# Patient Record
Sex: MALE | Race: ASIAN | NOT HISPANIC OR LATINO | URBAN - METROPOLITAN AREA
[De-identification: names, ages, dates, MRNs, and addresses within clinical notes are randomized per-mention and may not be internally consistent; named-entity substitution may affect disease eponyms.]

---

## 2017-12-11 ENCOUNTER — EMERGENCY (EMERGENCY)
Facility: HOSPITAL | Age: 47
LOS: 1 days | Discharge: HOME | End: 2017-12-11
Attending: EMERGENCY MEDICINE | Admitting: EMERGENCY MEDICINE
Payer: MEDICAID

## 2017-12-11 VITALS
HEART RATE: 70 BPM | SYSTOLIC BLOOD PRESSURE: 168 MMHG | TEMPERATURE: 98 F | DIASTOLIC BLOOD PRESSURE: 50 MMHG | OXYGEN SATURATION: 98 % | RESPIRATION RATE: 17 BRPM

## 2017-12-11 VITALS
DIASTOLIC BLOOD PRESSURE: 93 MMHG | SYSTOLIC BLOOD PRESSURE: 164 MMHG | HEART RATE: 77 BPM | HEIGHT: 66 IN | TEMPERATURE: 98 F | OXYGEN SATURATION: 98 % | WEIGHT: 188.05 LBS | RESPIRATION RATE: 16 BRPM

## 2017-12-11 PROCEDURE — 99284 EMERGENCY DEPT VISIT MOD MDM: CPT

## 2017-12-11 PROCEDURE — 96374 THER/PROPH/DIAG INJ IV PUSH: CPT

## 2017-12-11 PROCEDURE — 96375 TX/PRO/DX INJ NEW DRUG ADDON: CPT

## 2017-12-11 PROCEDURE — 99284 EMERGENCY DEPT VISIT MOD MDM: CPT | Mod: 25

## 2017-12-11 RX ORDER — EPINEPHRINE 0.3 MG/.3ML
0.3 INJECTION INTRAMUSCULAR; SUBCUTANEOUS
Qty: 1 | Refills: 0 | OUTPATIENT
Start: 2017-12-11 | End: 2017-12-11

## 2017-12-11 RX ORDER — DIPHENHYDRAMINE HCL 50 MG
25 CAPSULE ORAL ONCE
Qty: 0 | Refills: 0 | Status: COMPLETED | OUTPATIENT
Start: 2017-12-11 | End: 2017-12-11

## 2017-12-11 RX ORDER — FAMOTIDINE 10 MG/ML
20 INJECTION INTRAVENOUS ONCE
Qty: 0 | Refills: 0 | Status: COMPLETED | OUTPATIENT
Start: 2017-12-11 | End: 2017-12-11

## 2017-12-11 RX ORDER — SODIUM CHLORIDE 9 MG/ML
1000 INJECTION INTRAMUSCULAR; INTRAVENOUS; SUBCUTANEOUS ONCE
Qty: 0 | Refills: 0 | Status: DISCONTINUED | OUTPATIENT
Start: 2017-12-11 | End: 2017-12-15

## 2017-12-11 RX ADMIN — Medication 125 MILLIGRAM(S): at 09:31

## 2017-12-11 RX ADMIN — FAMOTIDINE 20 MILLIGRAM(S): 10 INJECTION INTRAVENOUS at 09:32

## 2017-12-11 RX ADMIN — Medication 25 MILLIGRAM(S): at 09:32

## 2017-12-11 NOTE — ED PROVIDER NOTE - OBJECTIVE STATEMENT
46 yo male no pmhx s/p drinking carrot juice 1 hour ago c/o intranasal swelling, hoarse voice, b/l eyelid swelling, difficulty breathing.  No difficulty swallowing. No rash, no chest pain.  Took 25mg of childrens benadryl prior to arrival.

## 2017-12-11 NOTE — ED PROVIDER NOTE - ENMT, MLM
Airway patent, b/l intranasal swelling noted. Mouth with normal mucosa. Throat has no vesicles, no oropharyngeal exudates. +uvula swelling, no tongue swelling

## 2017-12-11 NOTE — ED PROVIDER NOTE - MEDICAL DECISION MAKING DETAILS
allergic reaction to food, IV benadryl, IV steroids, IV pepcid, will hold on epipen, patients VSS otherwise stable, will observe

## 2017-12-11 NOTE — ED ADULT TRIAGE NOTE - CHIEF COMPLAINT QUOTE
"I had carrot juice with dimitri this morning. My throat hurts, I'm having trouble breathing, my nose is leaking. I took children's Benadryl this morning"

## 2017-12-11 NOTE — ED ADULT NURSE NOTE - CHPI ED SYMPTOMS NEG
no cough/no wheezing/no vomiting/no shortness of breath/no nausea/no rash/no difficulty swallowing/no difficulty breathing

## 2019-03-20 NOTE — ED ADULT NURSE NOTE - NS ED PATIENT SAFETY CONCERN
No I have reviewed and confirmed nurses' notes for patient's medications, allergies, medical history, and surgical history.

## 2019-04-06 ENCOUNTER — EMERGENCY (EMERGENCY)
Facility: HOSPITAL | Age: 49
LOS: 1 days | Discharge: HOME | End: 2019-04-06
Attending: EMERGENCY MEDICINE | Admitting: EMERGENCY MEDICINE
Payer: COMMERCIAL

## 2019-04-06 VITALS
WEIGHT: 190.04 LBS | HEART RATE: 68 BPM | TEMPERATURE: 98 F | SYSTOLIC BLOOD PRESSURE: 150 MMHG | DIASTOLIC BLOOD PRESSURE: 97 MMHG | OXYGEN SATURATION: 97 % | RESPIRATION RATE: 18 BRPM | HEIGHT: 66 IN

## 2019-04-06 PROCEDURE — 99284 EMERGENCY DEPT VISIT MOD MDM: CPT | Mod: 25

## 2019-04-06 PROCEDURE — 93971 EXTREMITY STUDY: CPT

## 2019-04-06 PROCEDURE — 93971 EXTREMITY STUDY: CPT | Mod: 26,RT

## 2019-04-06 PROCEDURE — 99284 EMERGENCY DEPT VISIT MOD MDM: CPT

## 2019-04-06 NOTE — ED PROVIDER NOTE - PROGRESS NOTE DETAILS
doppler negative, copy of result given, given information for neuro Dr Morgan, and podiatrist Dr Sheppard, advised call monday to arrange follow up , any worsening symptoms return to ED

## 2019-04-06 NOTE — ED ADULT NURSE NOTE - NSIMPLEMENTINTERV_GEN_ALL_ED
Implemented All Universal Safety Interventions:  [place] to call system. Call bell, personal items and telephone within reach. Instruct patient to call for assistance. Room bathroom lighting operational. Non-slip footwear when patient is off stretcher. Physically safe environment: no spills, clutter or unnecessary equipment. Stretcher in lowest position, wheels locked, appropriate side rails in place.

## 2019-04-06 NOTE — ED PROVIDER NOTE - OBJECTIVE STATEMENT
48 y male presents with numbness of right heel , states he was a passenger in a 2 hour car ride yesterday,  was sitting with his legs up , balancing his laptop during the trip.  denies any trauma, no calf pain.  otherwise feels well.  no chest pain, no sob, ambulated into ED.  denies pmh, nonsmoker.  PMD Dr Scales

## 2019-04-06 NOTE — ED ADULT NURSE NOTE - CHPI ED NUR SYMPTOMS NEG
no nausea/no tingling/no decreased eating/drinking/no dizziness/no pain/no chills/no fever/no weakness/no vomiting

## 2019-04-06 NOTE — ED ADULT NURSE NOTE - OBJECTIVE STATEMENT
Assumed care of this patient at 1850 Assumed care of this patient at 1850.  Pt A&Ox4, ambulatory to ED c/o right heel numbness.  Pt states that yesterday he felt something "funny" on his right heel: "almost like a piece of napkin was on the bottom of my foot."  Pt now c/o "numb" feeling isolated to right heel only.  Pt can feel stimulation but states it is "duller" on heel.  Positive pedal pulses, full ROM.  No wounds or abnormalities noted.

## 2019-04-06 NOTE — ED PROVIDER NOTE - ATTENDING CONTRIBUTION TO CARE
Pt is a 49 yo male who presents to the ED with a cc of heel numbness.  Pt with no significant past medical history.  He reports that yesterday he was a passenger in a 2 hour car ride.  He states that during the ride he had his legs propped up and was resting a laptop on his lap.  After the ride he noted numbness to the heel of his right foot only.  Denies trauma to the foot.  Denies previous injury to the foot.  When asked to further describe pt reports that it feels like there is extra cushion on his heel.  Denies HA, N/V, CP, SOB, abd pain.  Denies calf pain or swelling.  Pt is not a DM.  Pt has no prior history of PE or DVT.  On exam pt sitting in bed NAD, NCAT, PERRL, EOMI, heart RRR, lungs CTA.  Right foot/LE:  No calf swelling or TTP, sensation grossly intact but pt reports subjective diminished sensation to heel.  Achilles reflex intact, +pedal pulse, cap refill less then 2 seconds.  No marily TTP.  No skin changes noted.  CN II-XII intact with no focal deficits.  Will obtain duplex of lower ext. Pt is a 47 yo male who presents to the ED with a cc of heel numbness.  Pt with no significant past medical history.  He reports that yesterday he was a passenger in a 2 hour car ride.  He states that during the ride he had his legs propped up and was resting a laptop on his lap.  After the ride he noted numbness to the heel of his right foot only.  Denies trauma to the foot.  Denies previous injury to the foot.  When asked to further describe pt reports that it feels like there is extra cushion on his heel.  Denies HA, N/V, CP, SOB, abd pain.  Denies calf pain or swelling.  Pt is not a DM.  Pt has no prior history of PE or DVT.  On exam pt sitting in bed NAD, NCAT, PERRL, EOMI, heart RRR, lungs CTA.  Right foot/LE:  No calf swelling or TTP, sensation grossly intact but pt reports subjective diminished sensation to plantar aspect of heel.  Achilles reflex intact, +pedal pulse, cap refill less then 2 seconds.  No marily TTP.  No skin changes noted.  CN II-XII intact with no focal deficits.  Will obtain duplex of lower ext.

## 2021-04-01 ENCOUNTER — TRANSCRIPTION ENCOUNTER (OUTPATIENT)
Age: 51
End: 2021-04-01

## 2021-04-06 ENCOUNTER — TRANSCRIPTION ENCOUNTER (OUTPATIENT)
Age: 51
End: 2021-04-06

## 2021-12-08 ENCOUNTER — TRANSCRIPTION ENCOUNTER (OUTPATIENT)
Age: 51
End: 2021-12-08

## 2021-12-26 NOTE — ED PROVIDER NOTE - PROGRESS NOTE DETAILS
patient symptoms have significantly improved, voice back to normal, nasal mucose clear/no swelling
MED

## 2023-01-02 ENCOUNTER — NON-APPOINTMENT (OUTPATIENT)
Age: 53
End: 2023-01-02

## 2023-07-28 ENCOUNTER — APPOINTMENT (OUTPATIENT)
Dept: OTOLARYNGOLOGY | Facility: CLINIC | Age: 53
End: 2023-07-28

## 2024-05-29 ENCOUNTER — APPOINTMENT (OUTPATIENT)
Dept: OTOLARYNGOLOGY | Facility: CLINIC | Age: 54
End: 2024-05-29
Payer: COMMERCIAL

## 2024-05-29 ENCOUNTER — NON-APPOINTMENT (OUTPATIENT)
Age: 54
End: 2024-05-29

## 2024-05-29 VITALS
WEIGHT: 200 LBS | DIASTOLIC BLOOD PRESSURE: 88 MMHG | HEART RATE: 66 BPM | HEIGHT: 66 IN | SYSTOLIC BLOOD PRESSURE: 147 MMHG | BODY MASS INDEX: 32.14 KG/M2

## 2024-05-29 DIAGNOSIS — H69.90 UNSPECIFIED EUSTACHIAN TUBE DISORDER, UNSPECIFIED EAR: ICD-10-CM

## 2024-05-29 DIAGNOSIS — R06.83 SNORING: ICD-10-CM

## 2024-05-29 DIAGNOSIS — H91.90 UNSPECIFIED HEARING LOSS, UNSPECIFIED EAR: ICD-10-CM

## 2024-05-29 DIAGNOSIS — Z82.49 FAMILY HISTORY OF ISCHEMIC HEART DISEASE AND OTHER DISEASES OF THE CIRCULATORY SYSTEM: ICD-10-CM

## 2024-05-29 PROCEDURE — 92557 COMPREHENSIVE HEARING TEST: CPT

## 2024-05-29 PROCEDURE — 99203 OFFICE O/P NEW LOW 30 MIN: CPT

## 2024-05-29 PROCEDURE — 92567 TYMPANOMETRY: CPT

## 2024-05-29 NOTE — PHYSICAL EXAM
[Normal] : mucosa is normal [Midline] : trachea located in midline position [de-identified] : MALAMPATI CLASS 3

## 2024-05-29 NOTE — REVIEW OF SYSTEMS
[Seasonal Allergies] : seasonal allergies [Hearing Loss] : hearing loss [Problem Snoring] : problem snoring [Snoring With Pauses] : snoring with pauses [Negative] : Heme/Lymph [Patient Intake Form Reviewed] : Patient intake form was reviewed [de-identified] : Feel warmer than others

## 2024-05-29 NOTE — REASON FOR VISIT
[Initial Consultation] : an initial consultation for [FreeTextEntry2] : general hearing in both ears

## 2024-06-13 ENCOUNTER — EMERGENCY (EMERGENCY)
Facility: HOSPITAL | Age: 54
LOS: 1 days | Discharge: HOME | End: 2024-06-13
Attending: EMERGENCY MEDICINE | Admitting: EMERGENCY MEDICINE
Payer: COMMERCIAL

## 2024-06-13 VITALS
TEMPERATURE: 98 F | HEART RATE: 60 BPM | SYSTOLIC BLOOD PRESSURE: 170 MMHG | RESPIRATION RATE: 19 BRPM | DIASTOLIC BLOOD PRESSURE: 90 MMHG | OXYGEN SATURATION: 97 %

## 2024-06-13 VITALS
TEMPERATURE: 98 F | HEIGHT: 67 IN | RESPIRATION RATE: 19 BRPM | DIASTOLIC BLOOD PRESSURE: 94 MMHG | OXYGEN SATURATION: 97 % | SYSTOLIC BLOOD PRESSURE: 177 MMHG | HEART RATE: 68 BPM | WEIGHT: 195.11 LBS

## 2024-06-13 LAB
ALBUMIN SERPL ELPH-MCNC: 3.6 G/DL — SIGNIFICANT CHANGE UP (ref 3.3–5)
ALP SERPL-CCNC: 96 U/L — SIGNIFICANT CHANGE UP (ref 40–120)
ALT FLD-CCNC: 25 U/L — SIGNIFICANT CHANGE UP (ref 12–78)
ANION GAP SERPL CALC-SCNC: 5 MMOL/L — SIGNIFICANT CHANGE UP (ref 5–17)
AST SERPL-CCNC: 22 U/L — SIGNIFICANT CHANGE UP (ref 15–37)
BASOPHILS # BLD AUTO: 0.02 K/UL — SIGNIFICANT CHANGE UP (ref 0–0.2)
BASOPHILS NFR BLD AUTO: 0.2 % — SIGNIFICANT CHANGE UP (ref 0–2)
BILIRUB SERPL-MCNC: 0.5 MG/DL — SIGNIFICANT CHANGE UP (ref 0.2–1.2)
BUN SERPL-MCNC: 11 MG/DL — SIGNIFICANT CHANGE UP (ref 7–23)
CALCIUM SERPL-MCNC: 9.2 MG/DL — SIGNIFICANT CHANGE UP (ref 8.5–10.1)
CHLORIDE SERPL-SCNC: 105 MMOL/L — SIGNIFICANT CHANGE UP (ref 96–108)
CO2 SERPL-SCNC: 27 MMOL/L — SIGNIFICANT CHANGE UP (ref 22–31)
CREAT SERPL-MCNC: 0.93 MG/DL — SIGNIFICANT CHANGE UP (ref 0.5–1.3)
EGFR: 98 ML/MIN/1.73M2 — SIGNIFICANT CHANGE UP
EOSINOPHIL # BLD AUTO: 0.22 K/UL — SIGNIFICANT CHANGE UP (ref 0–0.5)
EOSINOPHIL NFR BLD AUTO: 2.4 % — SIGNIFICANT CHANGE UP (ref 0–6)
GLUCOSE SERPL-MCNC: 126 MG/DL — HIGH (ref 70–99)
HCT VFR BLD CALC: 39.4 % — SIGNIFICANT CHANGE UP (ref 39–50)
HGB BLD-MCNC: 13.8 G/DL — SIGNIFICANT CHANGE UP (ref 13–17)
IMM GRANULOCYTES NFR BLD AUTO: 1.1 % — HIGH (ref 0–0.9)
LYMPHOCYTES # BLD AUTO: 2.01 K/UL — SIGNIFICANT CHANGE UP (ref 1–3.3)
LYMPHOCYTES # BLD AUTO: 21.6 % — SIGNIFICANT CHANGE UP (ref 13–44)
MCHC RBC-ENTMCNC: 28.9 PG — SIGNIFICANT CHANGE UP (ref 27–34)
MCHC RBC-ENTMCNC: 35 GM/DL — SIGNIFICANT CHANGE UP (ref 32–36)
MCV RBC AUTO: 82.6 FL — SIGNIFICANT CHANGE UP (ref 80–100)
MONOCYTES # BLD AUTO: 0.6 K/UL — SIGNIFICANT CHANGE UP (ref 0–0.9)
MONOCYTES NFR BLD AUTO: 6.4 % — SIGNIFICANT CHANGE UP (ref 2–14)
NEUTROPHILS # BLD AUTO: 6.37 K/UL — SIGNIFICANT CHANGE UP (ref 1.8–7.4)
NEUTROPHILS NFR BLD AUTO: 68.3 % — SIGNIFICANT CHANGE UP (ref 43–77)
NRBC # BLD: 0 /100 WBCS — SIGNIFICANT CHANGE UP (ref 0–0)
PLATELET # BLD AUTO: 228 K/UL — SIGNIFICANT CHANGE UP (ref 150–400)
POTASSIUM SERPL-MCNC: 4 MMOL/L — SIGNIFICANT CHANGE UP (ref 3.5–5.3)
POTASSIUM SERPL-SCNC: 4 MMOL/L — SIGNIFICANT CHANGE UP (ref 3.5–5.3)
PROT SERPL-MCNC: 7.3 G/DL — SIGNIFICANT CHANGE UP (ref 6–8.3)
RBC # BLD: 4.77 M/UL — SIGNIFICANT CHANGE UP (ref 4.2–5.8)
RBC # FLD: 13 % — SIGNIFICANT CHANGE UP (ref 10.3–14.5)
SODIUM SERPL-SCNC: 137 MMOL/L — SIGNIFICANT CHANGE UP (ref 135–145)
WBC # BLD: 9.32 K/UL — SIGNIFICANT CHANGE UP (ref 3.8–10.5)
WBC # FLD AUTO: 9.32 K/UL — SIGNIFICANT CHANGE UP (ref 3.8–10.5)

## 2024-06-13 PROCEDURE — 80053 COMPREHEN METABOLIC PANEL: CPT

## 2024-06-13 PROCEDURE — 86618 LYME DISEASE ANTIBODY: CPT

## 2024-06-13 PROCEDURE — 99283 EMERGENCY DEPT VISIT LOW MDM: CPT

## 2024-06-13 PROCEDURE — 85025 COMPLETE CBC W/AUTO DIFF WBC: CPT

## 2024-06-13 PROCEDURE — 36415 COLL VENOUS BLD VENIPUNCTURE: CPT

## 2024-06-13 PROCEDURE — 86753 PROTOZOA ANTIBODY NOS: CPT

## 2024-06-13 PROCEDURE — 87168 MACROSCOPIC EXAM ARTHROPOD: CPT

## 2024-06-13 PROCEDURE — 86666 EHRLICHIA ANTIBODY: CPT

## 2024-06-13 PROCEDURE — 99284 EMERGENCY DEPT VISIT MOD MDM: CPT

## 2024-06-13 PROCEDURE — 99285 EMERGENCY DEPT VISIT HI MDM: CPT

## 2024-06-13 RX ADMIN — Medication 200 MILLIGRAM(S): at 13:08

## 2024-06-13 NOTE — ED ADULT NURSE NOTE - NS ED NOTE  TALK SOMEONE YN
RN spoke to Chichi but LUANA was at the door  She asked RN call back around 11:30 or later this afternoon/tomorrow if she does not answer.    No

## 2024-06-13 NOTE — ED ADULT NURSE NOTE - OBJECTIVE STATEMENT
presents to the er.  states this am, he was sitting on bed when he felt something behind his left knee.  he went to look and saw a black dot.  tried to swat it away, wouldn't move.  tried to pull at it, wouldn't move.  wife came in and attempted to pull off with a napkin.  once she did, it went to the floor and started to crawl.  the patient took video / image of it and sent to friend who stated it was a tick, but the head is not attached.  called MD who recommended er evaluation.  the patient also incidentally has had areas of bumps / rash noted over various parts of his body and he is wondering if this could be related.

## 2024-06-13 NOTE — ED PROVIDER NOTE - NSFOLLOWUPINSTRUCTIONS_ED_ALL_ED_FT
Ticks are insects that can bite. Most ticks live in bushes and grassy areas. They climb onto people and animals that go by. Then they bite. Some ticks carry germs that can make you sick.    How can I prevent tick bites?  Take these steps:    Before you go outside:    Wear long sleeves and long pants.  Wear light-colored clothes.  Tuck your pant legs into your socks.  Use an insect repellent that has 20% or higher of the ingredients DEET, picaridin, or [id number]. Follow the instructions on the label. Put it on:  Bare skin. Avoid your eyes and mouth areas.  The tops of your boots.  Your pant legs.  The ends of your sleeves.  If you use an insect repellent that has the ingredient permethrin, follow the instructions on the label. Do not put permethrin on the skin. Put it on:  Clothing.  Shoes.  Outdoor gear.  Tents.  When you are outside    Avoid walking through long grass.  Stay in the middle of the trail. Do not touch the bushes.  Check for ticks on your clothes, hair, and skin often while you are outside. Check again before you go inside.  When you go indoors    Check your clothes for ticks. Dry your clothes in a dryer on high heat for 10 minutes or more. If clothes are damp, more time may be needed.  Wash your clothes right away if they need to be washed. Use hot water.  Check your pets and outdoor gear.  Shower right away.  Check your body for ticks. Do a full body check using a mirror. Check your clothes, skin, head, neck, armpits, waist, groin, and joint areas.  What is the right way to remove a tick?  How to use tweezers to safely remove a tick.   Remove the tick from your skin as soon as possible. Do not remove the tick with your bare fingers. Do not try to remove a tick with heat, alcohol, petroleum jelly, or fingernail polish.  To remove a tick that is crawling on your skin:  Go outside and brush the tick off.  Use tape or a lint roller.  To remove a tick that is biting:  Wash your hands.  If you have gloves, put them on.  Use tweezers, curved forceps, or a tick-removal tool to grasp the tick. Grasp the tick as close to your skin and as close to the tick's head as possible.  Gently pull up until the tick lets go.  Try to keep the tick's head attached to its body.  Do not twist or jerk the tick.  Do not squeeze or crush the tick.  What should I do after taking out a tick?  Clean the bite area and your hands with soap and water, rubbing alcohol, or an iodine wash.  If you have an antiseptic cream or ointment, put a small amount on the bite area.  Wash and disinfect any instruments that you used to remove the tick.  How should I get rid of a live tick?  To get rid of a live tick, use one of these methods:  Place the tick in rubbing alcohol.  Place it in a bag or container you can close tightly. Throw it away.  Wrap it tightly in tape. Throw it away.  Flush it down the toilet.  Where to find more information  Centers for Disease Control and Prevention: cdc.gov/ticks  U.S. Environmental Protection Agency: epa.gov/insect-repellents  Contact a doctor if:  You have a fever or chills.  You have a red rash that makes a [place] (bull's-eye rash) in the bite area.  You have redness and swelling where the tick bit you.  You have a headache or stiff neck.  You have pain in a muscle, joint, or bone.  You are more tired than normal.  You have trouble walking or moving your legs.  You have numbness in your legs.  You have tender or swollen lymph glands.  You have belly (abdominal) pain, vomiting, watery poop (diarrhea), or weight loss.  Get help right away if:  You cannot remove a tick.  You cannot move (have paralysis) or feel weak.  You are feeling worse or have new symptoms.  You find a tick that is biting you and filled with blood, especially if you are in an area where diseases from ticks are common.  Summary  Ticks may carry germs that can make you sick.  To prevent tick bites wear long sleeves, long pants, and light colors. Use insect repellent. Follow the instructions on the label.  If the tick is biting, remove it right away. Do not try to remove it with heat, alcohol, petroleum jelly, or fingernail polish.  Contact a doctor if you have symptoms of a disease after being bitten by a tick.  This information is not intended to replace advice given to you by your health care provider. Make sure you discuss any questions you have with your health care provider.

## 2024-06-13 NOTE — ED PROVIDER NOTE - SKIN, MLM
punctate bite to medial posterior left knee with small area of localized erythema (bug in container c/w appearance of tick)

## 2024-06-13 NOTE — CONSULT NOTE ADULT - SUBJECTIVE AND OBJECTIVE BOX
[facility]  INFECTIOUS DISEASES   [address] [place] [address]  Tel: [phone]     Fax: [phone]  ========================================================  MD Crystal Covarrubias Kaushal, MD Cho, Michelle, MD Sunjit, Jaspal, MD  ========================================================    MRN-[id number]  STANLEY GUSMAN     CC: Tick  from body   HPI:  55yo man with no significant PMH was seen in ED after removing an insect from left popliteal area. He states that yesterday evening went out for a game and walked in grass and bushes.   About 3-4 hours ago noted a tick behind his left knee. He brought the insect to ED. Also he has an itchy maculopapular rash in his torso for about 4-5 weeks and recently his wife has the same.   Not sure if they have bed bugs or not. No recent changes in his clothing or skin products.    No fever/chills, chest pain, cough, runny nose, N/V/D, dysuria or any other complaint.     PAST MEDICAL & SURGICAL HISTORY:  No pertinent past medical history  No significant past surgical history    Social Hx: No current smoking, EtOH or drugs     FAMILY HISTORY:  Noncontributory     Allergies  No Known Allergies    MEDICATIONS  (STANDING):  None      REVIEW OF SYSTEMS:  CONSTITUTIONAL:  No Fever or chills  HEENT:  No diplopia or blurred vision.  No sore throat or runny nose.  CARDIOVASCULAR:  No chest pain   RESPIRATORY:  No cough, shortness of breath, PND or orthopnea.  GASTROINTESTINAL:  No nausea, vomiting or diarrhea.  GENITOURINARY:  No dysuria, frequency or urgency. No Blood in urine  MUSCULOSKELETAL:  no joint aches, no muscle pain  SKIN: + rash     Physical Exam:  Vital Signs Last 24 Hrs  T(C): 36.7 (13 Jun 2024 12:07), Max: 36.7 (13 Jun 2024 12:07)  T(F): 98 (13 Jun 2024 12:07), Max: 98 (13 Jun 2024 12:07)  HR: 68 (13 Jun 2024 12:07) (68 - 68)  BP: 177/94 (13 Jun 2024 12:07) (177/94 - 177/94)  BP(mean): --  RR: 19 (13 Jun 2024 12:07) (19 - 19)  SpO2: 97% (13 Jun 2024 12:07) (97% - 97%)  Parameters below as of 13 Jun 2024 12:07  Patient On (Oxygen Delivery Method): room air  Height (cm): 170.2 (06-13 @ 12:07)  Weight (kg): 88.5 (06-13 @ 12:07)  BMI (kg/m2): 30.6 (06-13 @ 12:07)  BSA (m2): 2 (06-13 @ 12:07)  GEN: NAD  HEENT: normocephalic and atraumatic. EOMI. PERRL.    NECK: Supple.  No lymphadenopathy   LUNGS: Clear to auscultation.  HEART: Regular rate and rhythm without murmur.  ABDOMEN: Soft, nontender, and nondistended.  Positive bowel sounds.    : No CVA tenderness  EXTREMITIES: Without edema.  NEUROLOGIC: grossly intact.  PSYCHIATRIC: Appropriate affect .  SKIN: maculopapular rash more prominent in bilateral abdomen but   also some in legs and arms. Palms and soles are spared.     Labs:  06-13    137  |  105  |  11  ----------------------------<  126<H>  4.0   |  27  |  0.93    Ca    9.2      13 Jun 2024 13:02    TPro  7.3  /  Alb  3.6  /  TBili  0.5  /  DBili  x   /  AST  22  /  ALT  25  /  AlkPhos  96  06-13                        13.8   9.32  )-----------( 228      ( 13 Jun 2024 13:02 )             39.4     Urinalysis Basic - ( 13 Jun 2024 13:02 )    Color: x / Appearance: x / SG: x / pH: x  Gluc: 126 mg/dL / Ketone: x  / Bili: x / Urobili: x   Blood: x / Protein: x / Nitrite: x   Leuk Esterase: x / RBC: x / WBC x   Sq Epi: x / Non Sq Epi: x / Bacteria: x    LIVER FUNCTIONS - ( 13 Jun 2024 13:02 )  Alb: 3.6 g/dL / Pro: 7.3 g/dL / ALK PHOS: 96 U/L / ALT: 25 U/L / AST: 22 U/L / GGT: x             All imaging and other data have been reviewed.      Assessment and Plan:   55yo man with no significant PMH was seen in ED after removing an insect from left popliteal area. He states that yesterday evening went out for a game and walked in grass and bushes.   About 3-4 hours ago noted a tick behind his left knee. He brought the insect to ED. Also he has an itchy maculopapular rash in his torso for about 4-5 weeks and recently his wife has the same.   Not sure if they have bed bugs or not. No recent changes in his clothing or skin products.    No fever/chills, chest pain, cough, runny nose, N/V/D, dysuria or any other complaint.    This could be a tick but unclear if this is Ixodes scapularis that causes lyme or other type of noninfectious ticks, also could be bed bug causing his and his wife's long standing rash.    Even if it is an infectious tick needs 36hours attachment to body.     Recommendations:   - Will send insect for ID   - Tick born serology has been sent  - Doxycycline 200mg one dose  - Follow up for results     Thank you for courtesy of this consult.     Discussed with the primary team.     Christa Khalil MD  Division of Infectious Diseases   Please call ID service at [phone] with any question.    75 minutes spent on total encounter assessing patient, examination, chart review, counseling and coordinating care by the attending physician/nurse/care manager.

## 2024-06-13 NOTE — ED PROVIDER NOTE - CARE PROVIDER_API CALL
Christa Khalil  Infectious Disease  [address] [place] [id number]  Phone: [phone]  Fax: [phone]  Follow Up Time:

## 2024-06-13 NOTE — ED PROVIDER NOTE - CARE PROVIDERS DIRECT ADDRESSES
[email or web address]

## 2024-06-13 NOTE — ED PROVIDER NOTE - PATIENT PORTAL LINK FT
You can access the FollowMyHealth Patient Portal offered by [facility] by registering at the following website: [email or web address]. By joining [employer]’s FollowMyHealth portal, you will also be able to view your health information using other applications (apps) compatible with our system.

## 2024-06-13 NOTE — ED PROVIDER NOTE - OBJECTIVE STATEMENT
54 M c/o tick bite to back of left knee. Noticed a black bump to the area this morning and wife pulled it out. Showed picture to friend that is an  and was told it is a tick and should go to ED. Pt has tick with him in container. Denies f/c, cp, sob, abd pain, n/v.

## 2024-06-13 NOTE — ED PROVIDER NOTE - PROGRESS NOTE DETAILS
Pt also with rash to other parts of body which wife has developed as well. ?tick ?bedbug  Insect he brought with him does look like a tick, was sent for evaluation in lab  Covered with doxycyline prophylaxis  Seen by Dr. Khalil who states pt okay for d/c, f/u for results

## 2024-06-13 NOTE — ED ADULT NURSE NOTE - NSFALLUNIVINTERV_ED_ALL_ED
Bed/Stretcher in lowest position, wheels locked, appropriate side rails in place/Call bell, personal items and telephone in reach/Instruct patient to call for assistance before getting out of bed/chair/stretcher/Non-slip footwear applied when patient is off stretcher/[place] to call system/Physically safe environment - no spills, clutter or unnecessary equipment/Purposeful proactive rounding/Room/bathroom lighting operational, light cord in reach
no

## 2024-06-13 NOTE — ED PROVIDER NOTE - ATTENDING APP SHARED VISIT CONTRIBUTION OF CARE
Patient came into the ED after his wife pulled a tick off his posterior left knee PTA. they heard a pop and patient is concerned that the head could still be inside his knee. Unsure when the tick attached. patient feels fine otherwise.     A&Ox3, NAD. +posterior left knee with pinpoint erythematous area, no swelling/drainage. FROM knee. good dorsalis pedal pulse. Patient ambulatory without distress.     tick noted to be mildly engorged in a plastic container, fully intact, alive and moving/walking around.

## 2024-06-15 LAB — INSECT IDENTIFICATION: ABNORMAL

## 2024-06-16 LAB
A PHAGOCYTOPH IGG TITR SER IF: SIGNIFICANT CHANGE UP
B BURGDOR AB SER QL IA: 0.13 IV — SIGNIFICANT CHANGE UP
B MICROTI IGG TITR SER: SIGNIFICANT CHANGE UP
E CHAFFEENSIS IGG TITR SER IF: SIGNIFICANT CHANGE UP

## 2025-03-26 NOTE — ED PROVIDER NOTE - RESPIRATORY, MLM
Breath sounds clear and equal bilaterally. Detail Level: Detailed Quality 358: Patient-Centered Surgical Risk Assessment And Communication: Documentation of patient-specific risk assessment with a risk calculator based on multi-institutional clinical data, the specific risk calculator used, and communication of risk assessment from risk calculator with the patient or family.

## 2025-07-30 NOTE — ED PROVIDER NOTE - NSPTACCESSSVCSAPPT_ED_ALL_ED
History of Present Illness     Patient Identification  Toni Brown is a 39 y.o. male.    Patient information was obtained from patient.    Chief Complaint   Chief Complaint   Patient presents with    Follow-up    Arm Pain       Patient presents with complaint of Neck and Bilateral Arm  pain. This is a result of no known injury. Onset of pain was 1 week ago and has been gradually worsening since. The pain is located in neck, described as burning and rated as moderate and severe, with radiation to both arms. Symptoms include weakness. The patient also Denied complains of fever, dysuria, history of cancer, history of osteoporosis,Has  history of steroid use and weight loss. The patient denies incontinence, dysuria, hematuria. The patient denies other injuries. Care prior to arrival consisted of NSAID and steroids and Muscle relaxors with no relief.    IMPRESSION:  1. No acute abnormality seen of the unenhanced cervical spine.  2. Minimal T2 hyperintensity within the central cord at the C5 through C7 levels. This may represent minimal prominence of the central canal of the cervical cord versus a tiny syrinx.  3. Scattered degenerative changes without significant spinal canal stenosis or neural foraminal narrowing.  Cer MRI w/wo contrast 7/24/25: C5-C6: There is central disc protrusion and annular fissure, without spinal  canal or foraminal stenosis.  Past Medical History:   Diagnosis Date    Diverticulitis      Family History   Problem Relation Age of Onset    Other Father         lymph node CA    Breast Cancer Paternal Aunt     Brain Cancer Paternal Aunt     Breast Cancer Paternal Grandmother     Colon Cancer Paternal Grandfather      Current Outpatient Medications   Medication Sig Dispense Refill    tiZANidine (ZANAFLEX) 4 MG tablet Take 1 tablet by mouth 3 times daily for 28 days 84 tablet 0     No current facility-administered medications for this visit.     Allergies   Allergen Reactions    Metformin Hcl       Specialty Care (immediate)...